# Patient Record
Sex: MALE | Race: WHITE
[De-identification: names, ages, dates, MRNs, and addresses within clinical notes are randomized per-mention and may not be internally consistent; named-entity substitution may affect disease eponyms.]

---

## 2020-02-26 ENCOUNTER — HOSPITAL ENCOUNTER (OUTPATIENT)
Dept: HOSPITAL 76 - EMS | Age: 50
Discharge: ANOTHER HOSPITAL | End: 2020-02-26
Attending: SURGERY
Payer: COMMERCIAL

## 2020-02-26 DIAGNOSIS — R07.9: Primary | ICD-10-CM

## 2022-07-27 ENCOUNTER — HOSPITAL ENCOUNTER (EMERGENCY)
Dept: HOSPITAL 76 - ED | Age: 52
LOS: 1 days | Discharge: HOME | End: 2022-07-28
Payer: COMMERCIAL

## 2022-07-27 DIAGNOSIS — H61.21: Primary | ICD-10-CM

## 2022-07-27 PROCEDURE — 99282 EMERGENCY DEPT VISIT SF MDM: CPT

## 2022-07-27 PROCEDURE — 69209 REMOVE IMPACTED EAR WAX UNI: CPT

## 2022-07-28 VITALS — DIASTOLIC BLOOD PRESSURE: 72 MMHG | SYSTOLIC BLOOD PRESSURE: 125 MMHG

## 2022-07-28 NOTE — ED PHYSICIAN DOCUMENTATION
PD HPI HEENT





- Stated complaint


Stated Complaint: CLOGGED EAR





- Chief complaint


Chief Complaint: Heent





- History obtained from


History obtained from: Patient





- History of Present Illness


Timing - onset: Today





- Additional information


Additional information: 





52-year-old male presents for clogged ear that occurred today.  Patient states 

that he works as an  and wears earplugs regularly.  He states that 

when he took out his earplugs today he noticed he could not hear out of his 

right ear and felt off balance.  He attempted to flush out his ear at home 

however it did not remove any earwax and instead he felt like he had water 

behind his ear and still could not hear.





Patient denies other symptoms at this time.





Review of Systems


Ten Systems: 10 systems reviewed and negative


Constitutional: denies: Fever


Ears: reports: Loss of hearing.  denies: Ear pain, Drainage/discharge, 

Tinnitus/ringing, Foreign body


Nose: denies: Rhinorrhea / runny nose





PD PAST MEDICAL HISTORY





- Past Medical History


Past Medical History: Yes


Psych: Anxiety





- Past Surgical History


Past Surgical History: Yes


Ortho: Arthroscopic surgery, Spine surgery





- Allergies


Allergies/Adverse Reactions: 


                                    Allergies











Allergy/AdvReac Type Severity Reaction Status Date / Time


 


No Known Drug Allergies Allergy   Verified 07/27/22 21:49














- Social History


Does the pt smoke?: No


Smoking Status: Never smoker


Does the pt drink ETOH?: No


Does the pt have substance abuse?: No





- Immunizations


Immunizations are current?: Yes





PD ED PE NORMAL





- Vitals


Vital signs reviewed: Yes





- General


General: Alert and oriented X 3, No acute distress, Well developed/nourished





- HEENT


HEENT: Atraumatic, PERRL, EOMI, Moist mucous membranes, Other (large wax burden 

R ear)





- Cardiac


Cardiac: RRR, No gallop, Strong equal pulses





- Respiratory


Respiratory: No respiratory distress, Clear bilaterally





- Abdomen


Abdomen: Soft, Non tender, Non distended





- Derm


Derm: Normal color, Warm and dry, No rash





- Extremities


Extremities: No deformity, No tenderness to palpate, Normal ROM s pain





- Neuro


Neuro: Alert and oriented X 3, CNs 2-12 intact, No motor deficit, No sensory 

deficit, Normal speech





- Psych


Psych: Normal mood, Normal affect





Results





- Vitals


Vitals: 


                               Vital Signs - 24 hr











  07/27/22 07/28/22





  21:47 00:17


 


Temperature 36.4 C L 36.5 C


 


Heart Rate 70 68


 


Respiratory 12 15





Rate  


 


Blood Pressure 122/82 H 125/72


 


O2 Saturation 100 99








                                     Oxygen











O2 Source                      Room air

















PD MEDICAL DECISION MAKING





- ED course


ED course: 





Cerumen impaction.  Irrigated by nursing staff with removal of large wax burden 

and complete restoration of hearing.  Repeat ear exam shows intact tympanic mem

branes, no evidence of canal injury.  Patient was instructed by nursing staff on

how to remove wax from his ears





Departure





- Departure


Disposition: 01 Home, Self Care


Clinical Impression: 


 Impacted cerumen of right ear





Condition: Stable


Instructions:  Earwax Impacted, ED Wax Ear Home Removal


Discharge Date/Time: 07/28/22 00:17

## 2023-07-07 ENCOUNTER — HOSPITAL ENCOUNTER (EMERGENCY)
Dept: HOSPITAL 76 - ED | Age: 53
Discharge: HOME | End: 2023-07-07
Payer: COMMERCIAL

## 2023-07-07 VITALS — SYSTOLIC BLOOD PRESSURE: 151 MMHG | DIASTOLIC BLOOD PRESSURE: 97 MMHG
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
## 2023-07-07 DIAGNOSIS — M54.50: ICD-10-CM
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
## 2023-07-07 DIAGNOSIS — S80.11XA: Primary | ICD-10-CM
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
## 2023-07-07 PROCEDURE — 99283 EMERGENCY DEPT VISIT LOW MDM: CPT

## 2023-07-07 PROCEDURE — 74176 CT ABD & PELVIS W/O CONTRAST: CPT

## 2023-07-07 PROCEDURE — 73590 X-RAY EXAM OF LOWER LEG: CPT

## 2023-07-07 PROCEDURE — 99284 EMERGENCY DEPT VISIT MOD MDM: CPT
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
## 2023-07-21 ENCOUNTER — HOSPITAL ENCOUNTER (OUTPATIENT)
Dept: HOSPITAL 76 - DI.S | Age: 53
Discharge: HOME | End: 2023-07-21
Attending: PHYSICAL MEDICINE & REHABILITATION
Payer: COMMERCIAL

## 2023-07-21 DIAGNOSIS — M47.812: Primary | ICD-10-CM

## 2023-07-21 NOTE — XRAY REPORT
PROCEDURE:  Cervical Spine 2 View

 

INDICATIONS:  SPRAIN OF LIGAMENTS OF CERVICAL SPINE

 

TECHNIQUE:  3 view(s) of the cervical spine were acquired.  

 

COMPARISON:  None.

 

FINDINGS:  

 

Bones:  No fractures or dislocations to the C7 level.  The lateral masses of C1 appear intact on the 
odontoid view.  No suspicious bony lesions.  Straightening of normal cervical lordosis. Minimal anter
olisthesis of C4 on C5. Degenerative changes, worse at C5-C6 with disc height loss, osteophyte format
ion, facet and uncovertebral arthropathy.

 

Soft tissues:  No prevertebral soft tissue swelling.  

 

IMPRESSION:  Degenerative changes of the cervical spine, worse at C5-C6. No acute fracture.

 

 

 

Reviewed by: Jason Parikh MD on 7/21/2023 9:45 AM PDT

Approved by: Jason Parikh MD on 7/21/2023 9:45 AM PDT

 

 

Station ID:  [id number]